# Patient Record
Sex: MALE | Race: ASIAN | Employment: OTHER | URBAN - METROPOLITAN AREA
[De-identification: names, ages, dates, MRNs, and addresses within clinical notes are randomized per-mention and may not be internally consistent; named-entity substitution may affect disease eponyms.]

---

## 2021-01-26 ENCOUNTER — OFFICE VISIT (OUTPATIENT)
Dept: NEUROLOGY | Facility: CLINIC | Age: 56
End: 2021-01-26
Payer: MEDICARE

## 2021-01-26 VITALS
RESPIRATION RATE: 16 BRPM | WEIGHT: 186 LBS | HEIGHT: 66.5 IN | SYSTOLIC BLOOD PRESSURE: 138 MMHG | HEART RATE: 74 BPM | DIASTOLIC BLOOD PRESSURE: 72 MMHG | BODY MASS INDEX: 29.54 KG/M2

## 2021-01-26 DIAGNOSIS — I65.23 CAROTID STENOSIS, ASYMPTOMATIC, BILATERAL: ICD-10-CM

## 2021-01-26 DIAGNOSIS — I63.511 STROKE DUE TO STENOSIS OF RIGHT MIDDLE CEREBRAL ARTERY (HCC): Primary | ICD-10-CM

## 2021-01-26 DIAGNOSIS — I48.91 ATRIAL FIBRILLATION, UNSPECIFIED TYPE (HCC): ICD-10-CM

## 2021-01-26 PROCEDURE — 99204 OFFICE O/P NEW MOD 45 MIN: CPT | Performed by: OTHER

## 2021-01-26 RX ORDER — OLMESARTAN MEDOXOMIL 20 MG/1
20 TABLET ORAL DAILY
COMMUNITY

## 2021-01-26 RX ORDER — EMPAGLIFLOZIN 25 MG/1
1 TABLET, FILM COATED ORAL DAILY
COMMUNITY

## 2021-01-26 RX ORDER — PIOGLITAZONE HCL AND METFORMIN HCL 850; 15 MG/1; MG/1
1 TABLET ORAL 2 TIMES DAILY WITH MEALS
COMMUNITY

## 2021-01-26 RX ORDER — LINAGLIPTIN 5 MG/1
5 TABLET, FILM COATED ORAL DAILY
COMMUNITY

## 2021-01-26 RX ORDER — GABAPENTIN 300 MG/1
300 CAPSULE ORAL 3 TIMES DAILY
COMMUNITY
End: 2021-03-03

## 2021-01-26 RX ORDER — ATORVASTATIN CALCIUM 80 MG/1
80 TABLET, FILM COATED ORAL NIGHTLY
COMMUNITY

## 2021-01-26 RX ORDER — ASPIRIN AND DIPYRIDAMOLE 25; 200 MG/1; MG/1
1 CAPSULE, EXTENDED RELEASE ORAL 2 TIMES DAILY
COMMUNITY

## 2021-01-26 NOTE — PROGRESS NOTES
[id number] [facility] with [facility]  1/26/2021    12:02 PM      48 RHM    HPI:  Following vitrectomy of the left eye, he had a stroke with right temporal stroke which left him with left sided weakness.    He nev FAM: family history includes Diabetes in his mother; High Cholesterol in his father and mother; Hypertension in his father and mother; Kidney Disease in his mother; Stroke in his father. ROS: A comprehensive 10 point review of systems was completed.   Pe

## 2021-02-01 ENCOUNTER — HOSPITAL ENCOUNTER (OUTPATIENT)
Dept: MRI IMAGING | Facility: HOSPITAL | Age: 56
Discharge: HOME | End: 2021-02-01
Attending: Other
Payer: MEDICARE

## 2021-02-01 DIAGNOSIS — I65.23 CAROTID STENOSIS, ASYMPTOMATIC, BILATERAL: ICD-10-CM

## 2021-02-01 DIAGNOSIS — I63.511 STROKE DUE TO STENOSIS OF RIGHT MIDDLE CEREBRAL ARTERY (HCC): ICD-10-CM

## 2021-02-01 LAB — CREAT BLD-MCNC: 1.2 MG/DL

## 2021-02-01 PROCEDURE — 70549 MR ANGIOGRAPH NECK W/O&W/DYE: CPT | Performed by: OTHER

## 2021-02-01 PROCEDURE — 70546 MR ANGIOGRAPH HEAD W/O&W/DYE: CPT | Performed by: OTHER

## 2021-02-01 PROCEDURE — 82565 ASSAY OF CREATININE: CPT

## 2021-02-01 PROCEDURE — 70553 MRI BRAIN STEM W/O & W/DYE: CPT | Performed by: OTHER

## 2021-02-01 PROCEDURE — A9575 INJ GADOTERATE MEGLUMI 0.1ML: HCPCS | Performed by: OTHER

## 2021-03-03 ENCOUNTER — OFFICE VISIT (OUTPATIENT)
Dept: NEUROLOGY | Facility: CLINIC | Age: 56
End: 2021-03-03
Payer: MEDICARE

## 2021-03-03 VITALS
SYSTOLIC BLOOD PRESSURE: 132 MMHG | HEART RATE: 74 BPM | WEIGHT: 186 LBS | RESPIRATION RATE: 16 BRPM | BODY MASS INDEX: 29.54 KG/M2 | DIASTOLIC BLOOD PRESSURE: 74 MMHG | HEIGHT: 66.5 IN

## 2021-03-03 DIAGNOSIS — I65.23 CAROTID STENOSIS, ASYMPTOMATIC, BILATERAL: ICD-10-CM

## 2021-03-03 DIAGNOSIS — G89.0 CENTRAL PAIN SYNDROME: ICD-10-CM

## 2021-03-03 DIAGNOSIS — I48.91 ATRIAL FIBRILLATION, UNSPECIFIED TYPE (HCC): ICD-10-CM

## 2021-03-03 DIAGNOSIS — I63.511 STROKE DUE TO STENOSIS OF RIGHT MIDDLE CEREBRAL ARTERY (HCC): Primary | ICD-10-CM

## 2021-03-03 PROCEDURE — 99214 OFFICE O/P EST MOD 30 MIN: CPT | Performed by: OTHER

## 2021-03-03 RX ORDER — CLOPIDOGREL BISULFATE 75 MG/1
75 TABLET ORAL DAILY
Qty: 90 TABLET | Refills: 3 | Status: SHIPPED | OUTPATIENT
Start: 2021-03-03

## 2021-03-03 RX ORDER — ASPIRIN 81 MG/1
81 TABLET ORAL DAILY
Qty: 90 TABLET | Refills: 3 | Status: SHIPPED | OUTPATIENT
Start: 2021-03-03

## 2021-03-03 RX ORDER — GABAPENTIN 300 MG/1
300 CAPSULE ORAL 3 TIMES DAILY
Qty: 270 CAPSULE | Refills: 3 | Status: SHIPPED | OUTPATIENT
Start: 2021-03-03

## 2021-03-03 NOTE — PROGRESS NOTES
[facility] with [address]  7/27/1965  Primary Care Provider:  Meghana Baker II    3/3/2021  Accompanied visit: sister    ( ) No.      48year old yo patient being seen for: conjunctiva anicteric sclerae  Neck no adenopathy, thyroid normal  Heart and Lungs:  normal    NEURO  Same findings      INTERPRETATION of RELEVANT LABS and other DATA:    CONCLUSION:    1.  Mild irregularity of M1 segments bilaterally without focal signifi [facility] meetings - patient not present --non F2F  (  ) Independent Historian obtained    Non Face to Face CPT code 71811/40532 applies as documented above    PROCEDURE DONE     (   ) see notes      After visit, patient was escorted out and handed-off discharge

## 2021-03-15 ENCOUNTER — ANCILLARY PROCEDURE (OUTPATIENT)
Dept: CARDIOLOGY | Age: 56
End: 2021-03-15
Attending: INTERNAL MEDICINE

## 2021-03-15 ENCOUNTER — ANCILLARY ORDERS (OUTPATIENT)
Dept: CARDIOLOGY | Age: 56
End: 2021-03-15

## 2021-03-15 ENCOUNTER — OFFICE VISIT (OUTPATIENT)
Dept: CARDIOLOGY | Age: 56
End: 2021-03-15

## 2021-03-15 VITALS
HEIGHT: 66 IN | BODY MASS INDEX: 31.98 KG/M2 | HEART RATE: 79 BPM | SYSTOLIC BLOOD PRESSURE: 126 MMHG | DIASTOLIC BLOOD PRESSURE: 74 MMHG | WEIGHT: 199 LBS

## 2021-03-15 DIAGNOSIS — I63.9 CEREBROVASCULAR ACCIDENT (CVA), UNSPECIFIED MECHANISM (CMD): ICD-10-CM

## 2021-03-15 DIAGNOSIS — Z98.890 HISTORY OF LOOP RECORDER: ICD-10-CM

## 2021-03-15 DIAGNOSIS — Q21.12 PFO (PATENT FORAMEN OVALE): ICD-10-CM

## 2021-03-15 DIAGNOSIS — I25.10 CORONARY ARTERY DISEASE INVOLVING NATIVE CORONARY ARTERY OF NATIVE HEART WITHOUT ANGINA PECTORIS: ICD-10-CM

## 2021-03-15 DIAGNOSIS — I48.91 ATRIAL FIBRILLATION, UNSPECIFIED TYPE (CMD): Primary | ICD-10-CM

## 2021-03-15 PROBLEM — E11.39 TYPE 2 DIABETES MELLITUS WITH OPHTHALMIC COMPLICATION, WITHOUT LONG-TERM CURRENT USE OF INSULIN (CMD): Status: ACTIVE | Noted: 2021-03-15

## 2021-03-15 PROCEDURE — 93291 INTERROG DEV EVAL SCRMS IP: CPT | Performed by: INTERNAL MEDICINE

## 2021-03-15 PROCEDURE — 99205 OFFICE O/P NEW HI 60 MIN: CPT | Performed by: INTERNAL MEDICINE

## 2021-03-15 PROCEDURE — 93000 ELECTROCARDIOGRAM COMPLETE: CPT | Performed by: INTERNAL MEDICINE

## 2021-03-15 RX ORDER — ATORVASTATIN CALCIUM 80 MG/1
80 TABLET, FILM COATED ORAL AT BEDTIME
COMMUNITY

## 2021-03-15 RX ORDER — OLMESARTAN MEDOXOMIL 20 MG/1
20 TABLET ORAL DAILY
COMMUNITY

## 2021-03-15 RX ORDER — PANTOPRAZOLE SODIUM 40 MG/1
40 TABLET, DELAYED RELEASE ORAL DAILY
COMMUNITY
Start: 2020-08-27

## 2021-03-15 RX ORDER — ASPIRIN 81 MG/1
81 TABLET ORAL DAILY
COMMUNITY
Start: 2021-03-03

## 2021-03-15 RX ORDER — PIOGLITAZONE HCL AND METFORMIN HCL 850; 15 MG/1; MG/1
1 TABLET ORAL 2 TIMES DAILY
COMMUNITY

## 2021-03-15 RX ORDER — ASPIRIN AND DIPYRIDAMOLE 25; 200 MG/1; MG/1
1 CAPSULE, EXTENDED RELEASE ORAL DAILY
COMMUNITY
End: 2021-03-15 | Stop reason: CLARIF

## 2021-03-15 RX ORDER — CLOPIDOGREL BISULFATE 75 MG/1
75 TABLET ORAL DAILY
COMMUNITY
Start: 2021-03-03

## 2021-03-15 RX ORDER — GABAPENTIN 300 MG/1
300 CAPSULE ORAL DAILY
COMMUNITY
Start: 2021-03-03

## 2021-03-15 SDOH — HEALTH STABILITY: PHYSICAL HEALTH: ON AVERAGE, HOW MANY MINUTES DO YOU ENGAGE IN EXERCISE AT THIS LEVEL?: 0 MIN

## 2021-03-15 SDOH — SOCIAL STABILITY: SOCIAL NETWORK: ARE YOU MARRIED, WIDOWED, DIVORCED, SEPARATED, NEVER MARRIED, OR LIVING WITH A PARTNER?: NEVER MARRIED

## 2021-03-15 SDOH — HEALTH STABILITY: PHYSICAL HEALTH: ON AVERAGE, HOW MANY DAYS PER WEEK DO YOU ENGAGE IN MODERATE TO STRENUOUS EXERCISE (LIKE A BRISK WALK)?: 0 DAYS

## 2021-03-15 ASSESSMENT — PATIENT HEALTH QUESTIONNAIRE - PHQ9
CLINICAL INTERPRETATION OF PHQ9 SCORE: NO FURTHER SCREENING NEEDED
CLINICAL INTERPRETATION OF PHQ2 SCORE: NO FURTHER SCREENING NEEDED
SUM OF ALL RESPONSES TO PHQ9 QUESTIONS 1 AND 2: 0
2. FEELING DOWN, DEPRESSED OR HOPELESS: NOT AT ALL
1. LITTLE INTEREST OR PLEASURE IN DOING THINGS: NOT AT ALL
SUM OF ALL RESPONSES TO PHQ9 QUESTIONS 1 AND 2: 0

## 2021-04-12 PROCEDURE — 93298 REM INTERROG DEV EVAL SCRMS: CPT | Performed by: INTERNAL MEDICINE

## 2021-04-12 PROCEDURE — G2066 INTER DEVC REMOTE 30D: HCPCS | Performed by: INTERNAL MEDICINE

## 2021-04-16 ENCOUNTER — ANCILLARY PROCEDURE (OUTPATIENT)
Dept: CARDIOLOGY | Age: 56
End: 2021-04-16
Attending: INTERNAL MEDICINE

## 2021-04-16 ENCOUNTER — ANCILLARY ORDERS (OUTPATIENT)
Dept: CARDIOLOGY | Age: 56
End: 2021-04-16

## 2021-04-16 DIAGNOSIS — Z98.890 HISTORY OF LOOP RECORDER: ICD-10-CM

## 2021-05-13 PROCEDURE — 93298 REM INTERROG DEV EVAL SCRMS: CPT | Performed by: INTERNAL MEDICINE

## 2021-05-13 PROCEDURE — G2066 INTER DEVC REMOTE 30D: HCPCS | Performed by: INTERNAL MEDICINE

## 2021-06-13 PROCEDURE — 93298 REM INTERROG DEV EVAL SCRMS: CPT | Performed by: INTERNAL MEDICINE

## 2021-06-13 PROCEDURE — G2066 INTER DEVC REMOTE 30D: HCPCS | Performed by: INTERNAL MEDICINE

## 2021-06-15 ENCOUNTER — TELEPHONE (OUTPATIENT)
Dept: CARDIOLOGY | Age: 56
End: 2021-06-15

## 2021-09-28 ENCOUNTER — LAB ENCOUNTER (OUTPATIENT)
Dept: LAB | Facility: HOSPITAL | Age: 56
End: 2021-09-28
Attending: INTERNAL MEDICINE
Payer: MEDICARE

## 2021-09-28 DIAGNOSIS — Z95.818 STATUS POST PLACEMENT OF IMPLANTABLE LOOP RECORDER: ICD-10-CM

## 2021-09-29 NOTE — H&P
[place]  Pre Procedure History and Physical    [facility] Patient Status:  Outpatient in a Bed    1965 MRN [id number]   Location [address] [facility] Attending Tony Blake MD   Hosp Day # 0 PCP [facility] NEPRELIM, WBC, PLT in the last 168 hours. [unfilled]  No results for input(s): BNP in the last 168 hours. No results found for: PT, INR  No results found for: TROP      No results found.        Assessment and Plan    There is no problem list on file for

## 2021-10-01 ENCOUNTER — HOSPITAL ENCOUNTER (OUTPATIENT)
Dept: INTERVENTIONAL RADIOLOGY/VASCULAR | Facility: HOSPITAL | Age: 56
Discharge: HOME | End: 2021-10-01
Attending: INTERNAL MEDICINE | Admitting: INTERNAL MEDICINE
Payer: MEDICARE

## 2021-10-01 VITALS
WEIGHT: 200 LBS | HEIGHT: 66 IN | HEART RATE: 84 BPM | SYSTOLIC BLOOD PRESSURE: 155 MMHG | OXYGEN SATURATION: 98 % | BODY MASS INDEX: 32.14 KG/M2 | RESPIRATION RATE: 14 BRPM | DIASTOLIC BLOOD PRESSURE: 78 MMHG

## 2021-10-01 DIAGNOSIS — Z95.818 STATUS POST PLACEMENT OF IMPLANTABLE LOOP RECORDER: Primary | ICD-10-CM

## 2021-10-01 PROCEDURE — 82962 GLUCOSE BLOOD TEST: CPT

## 2021-10-01 PROCEDURE — 0JPT02Z REMOVAL OF MONITORING DEVICE FROM TRUNK SUBCUTANEOUS TISSUE AND FASCIA, OPEN APPROACH: ICD-10-PCS | Performed by: INTERNAL MEDICINE

## 2021-10-01 RX ORDER — LIDOCAINE HYDROCHLORIDE AND EPINEPHRINE 10; 10 MG/ML; UG/ML
INJECTION, SOLUTION INFILTRATION; PERINEURAL
Status: COMPLETED
Start: 2021-10-01 | End: 2021-10-01

## 2021-10-01 NOTE — PROCEDURES
[facility]  Implantable Loop Recorder Removal Procedure Note    Donnalee Heady Patient Status:  Outpatient in a Bed    1950 MRN [id number]   Location [address] [facility] Attending No att. providers found   [facility] Day # 0

## 2021-10-01 NOTE — PROGRESS NOTES
Pt s/p loop recorder removal in holding with Dr. Saleem Anthony. Left chest post removal with steri-strips and aquacel dressing, CDI, no bleeding or hematoma. Discharge instructions reviewed with pt and pt sister, copy given.  Pt discharged to [facility] entrance via

## 2022-02-08 RX ORDER — CLOPIDOGREL BISULFATE 75 MG/1
TABLET ORAL
Qty: 30 TABLET | Refills: 0 | Status: SHIPPED | OUTPATIENT
Start: 2022-02-08

## 2022-02-08 RX ORDER — ASPIRIN 81 MG/1
TABLET, COATED ORAL
Qty: 30 TABLET | Refills: 0 | Status: SHIPPED | OUTPATIENT
Start: 2022-02-08

## 2022-03-21 ENCOUNTER — OFFICE VISIT (OUTPATIENT)
Dept: NEUROLOGY | Facility: CLINIC | Age: 57
End: 2022-03-21
Payer: MEDICARE

## 2022-03-21 VITALS
BODY MASS INDEX: 32.14 KG/M2 | HEIGHT: 66 IN | SYSTOLIC BLOOD PRESSURE: 130 MMHG | RESPIRATION RATE: 16 BRPM | DIASTOLIC BLOOD PRESSURE: 80 MMHG | HEART RATE: 74 BPM | WEIGHT: 200 LBS

## 2022-03-21 DIAGNOSIS — I63.511 STROKE DUE TO STENOSIS OF RIGHT MIDDLE CEREBRAL ARTERY (HCC): Primary | ICD-10-CM

## 2022-03-21 DIAGNOSIS — I48.91 ATRIAL FIBRILLATION, UNSPECIFIED TYPE (HCC): ICD-10-CM

## 2022-03-21 PROCEDURE — 99214 OFFICE O/P EST MOD 30 MIN: CPT | Performed by: OTHER

## 2022-09-13 ENCOUNTER — PATIENT MESSAGE (OUTPATIENT)
Dept: NEUROLOGY | Facility: CLINIC | Age: 57
End: 2022-09-13

## 2022-09-14 NOTE — TELEPHONE ENCOUNTER
From: Shruthi Galarza  To: Ulises Covarrubias MD  Sent: 9/13/2022 10:31 AM CDT  Subject: Requesting a Letter    Hi Doctor,  My brother, Yaneth Jacinto who is your patient is applying for State ID. As one of the requirements, they need a letter from the doctor that he has disabilities. Thank you.

## 2022-09-14 NOTE — TELEPHONE ENCOUNTER
Asked family via [employer] to provide more specific information as to what they are applying for and what they need from this office.

## 2022-09-16 NOTE — TELEPHONE ENCOUNTER
Letter pended to include information below. Routed to provider for edit/signature. Hello,  He is applying for [place] ID and was informed that the letter should include his disabilities: legally blind/weakness due to post CVA, with this letter, his ID will indicate that he has disabilities. Thank you.

## 2022-10-15 ENCOUNTER — LAB ENCOUNTER (OUTPATIENT)
Dept: LAB | Facility: HOSPITAL | Age: 57
End: 2022-10-15
Attending: Other
Payer: MEDICARE

## 2022-10-15 DIAGNOSIS — I63.511 STROKE DUE TO STENOSIS OF RIGHT MIDDLE CEREBRAL ARTERY (HCC): ICD-10-CM

## 2022-10-15 DIAGNOSIS — I48.91 ATRIAL FIBRILLATION, UNSPECIFIED TYPE (HCC): ICD-10-CM

## 2022-10-15 LAB
ASPIRIN PLT INHIBITION: 404 ARU (ref 620–672)
P2Y12 REACTION UNITS: 156 PRU

## 2022-10-15 PROCEDURE — 85576 BLOOD PLATELET AGGREGATION: CPT

## 2022-10-15 PROCEDURE — 36415 COLL VENOUS BLD VENIPUNCTURE: CPT

## 2022-10-17 ENCOUNTER — OFFICE VISIT (OUTPATIENT)
Dept: NEUROLOGY | Facility: CLINIC | Age: 57
End: 2022-10-17
Payer: MEDICARE

## 2022-10-17 ENCOUNTER — TELEPHONE (OUTPATIENT)
Dept: NEUROLOGY | Facility: CLINIC | Age: 57
End: 2022-10-17

## 2022-10-17 DIAGNOSIS — I63.511 STROKE DUE TO STENOSIS OF RIGHT MIDDLE CEREBRAL ARTERY (HCC): Primary | ICD-10-CM

## 2022-10-17 DIAGNOSIS — I48.91 ATRIAL FIBRILLATION, UNSPECIFIED TYPE (HCC): ICD-10-CM

## 2022-10-17 DIAGNOSIS — R29.898 LEFT LEG WEAKNESS: ICD-10-CM

## 2023-04-17 ENCOUNTER — OFFICE VISIT (OUTPATIENT)
Dept: NEUROLOGY | Facility: CLINIC | Age: 58
End: 2023-04-17
Payer: MEDICARE

## 2023-04-17 VITALS
HEIGHT: 66 IN | DIASTOLIC BLOOD PRESSURE: 81 MMHG | WEIGHT: 200 LBS | RESPIRATION RATE: 16 BRPM | HEART RATE: 81 BPM | SYSTOLIC BLOOD PRESSURE: 156 MMHG | BODY MASS INDEX: 32.14 KG/M2

## 2023-04-17 DIAGNOSIS — R42 DIZZINESS: ICD-10-CM

## 2023-04-17 DIAGNOSIS — R29.898 LEFT LEG WEAKNESS: Primary | ICD-10-CM

## 2023-04-17 DIAGNOSIS — I63.511 STROKE DUE TO STENOSIS OF RIGHT MIDDLE CEREBRAL ARTERY (HCC): ICD-10-CM

## 2023-04-17 DIAGNOSIS — I48.91 ATRIAL FIBRILLATION, UNSPECIFIED TYPE (HCC): ICD-10-CM

## 2023-04-17 PROCEDURE — 99213 OFFICE O/P EST LOW 20 MIN: CPT | Performed by: OTHER

## 2024-04-03 ENCOUNTER — OFFICE VISIT (OUTPATIENT)
Dept: NEUROLOGY | Facility: CLINIC | Age: 59
End: 2024-04-03
Payer: MEDICARE

## 2024-04-03 VITALS
SYSTOLIC BLOOD PRESSURE: 142 MMHG | HEART RATE: 105 BPM | RESPIRATION RATE: 16 BRPM | WEIGHT: 200 LBS | DIASTOLIC BLOOD PRESSURE: 68 MMHG | BODY MASS INDEX: 32.14 KG/M2 | HEIGHT: 66 IN

## 2024-04-03 DIAGNOSIS — R29.898 LEFT LEG WEAKNESS: Primary | ICD-10-CM

## 2024-04-03 DIAGNOSIS — I63.511 STROKE DUE TO STENOSIS OF RIGHT MIDDLE CEREBRAL ARTERY (HCC): ICD-10-CM

## 2024-04-03 DIAGNOSIS — I15.0 RENOVASCULAR HYPERTENSION: ICD-10-CM

## 2024-04-03 PROCEDURE — 99214 OFFICE O/P EST MOD 30 MIN: CPT | Performed by: OTHER

## 2024-04-03 RX ORDER — AMLODIPINE BESYLATE 5 MG/1
5 TABLET ORAL DAILY
COMMUNITY

## 2024-04-03 RX ORDER — GABAPENTIN 100 MG/1
100 CAPSULE ORAL 2 TIMES DAILY
COMMUNITY
Start: 2024-01-14

## 2024-04-03 RX ORDER — PIOGLITAZONEHYDROCHLORIDE 45 MG/1
45 TABLET ORAL DAILY
COMMUNITY
Start: 2024-02-01

## 2024-04-03 RX ORDER — PANTOPRAZOLE SODIUM 40 MG/1
40 TABLET, DELAYED RELEASE ORAL DAILY
COMMUNITY
Start: 2023-12-23

## 2024-04-03 NOTE — PROGRESS NOTES
NEUROLOGY  [facility]       Steve Bolton  7/27/1965  Primary Care Provider:  TRUMAN SHEPHERD II    4/3/2024  58 year old yo,  was last seen on:: April 2023    Seen for/plans last visit:  Post stroke left hemiparesis    Previous visit and existing record notes reviewed in preparation for the face to face visit.  Relevant labs and studies reviewed and will be noted in relevant areas of this record.  Accompanied visit: family    () No.      Present condition:  July 2023 had increase NV and left sided numbness and thought he had another stroke,. MRI however was negative.     Now BP is better    Past History update/new problem(s): as above    Review of Systems:  Review of Systems:  Denies systemic symptoms     No CP or SOB.  No GI or  symptoms. Relevant Neuro as noted above.      Medications:      Current Outpatient Medications:     amLODIPine 5 MG Oral Tab, Take 1 tablet (5 mg total) by mouth daily., Disp: , Rfl:     gabapentin 100 MG Oral Cap, Take 1 capsule (100 mg total) by mouth 2 (two) times daily., Disp: , Rfl:     metFORMIN 850 MG Oral Tab, Take 1 tablet (850 mg total) by mouth 2 (two) times daily with meals., Disp: , Rfl:     pantoprazole 40 MG Oral Tab EC, Take 1 tablet (40 mg total) by mouth daily., Disp: , Rfl:     pioglitazone 45 MG Oral Tab, Take 1 tablet (45 mg total) by mouth daily., Disp: , Rfl:     CLOPIDOGREL 75 MG Oral Tab, TAKE 1 TABLET DAILY, Disp: 30 tablet, Rfl: 0    ASPIRIN LOW DOSE 81 MG Oral Tab EC, TAKE 1 TABLET DAILY, Disp: 30 tablet, Rfl: 0    olmesartan 20 MG Oral Tab, Take 1 tablet (20 mg total) by mouth daily., Disp: , Rfl:     atorvastatin 80 MG Oral Tab, Take 1 tablet (80 mg total) by mouth nightly., Disp: , Rfl:     Riboflavin (B-2-400) 400 MG Oral Cap, Take by mouth., Disp: , Rfl:   PRN:     Allergies:  Allergies   Allergen Reactions    Penicillins UNKNOWN and OTHER (SEE COMMENTS)     Other reaction(s): Other (See Comments)  unknown            EXAM:  BP  142/68 (BP Location: Left arm, Patient Position: Sitting, Cuff Size: adult)   Pulse 105   Resp 16   Ht 66\"   Wt 200 lb (90.7 kg)   BMI 32.28 kg/m²   Looks stated age  General Exam:  HENT:  pink conjunctiva anicteric sclerae  Neck no adenopathy, thyroid normal  Heart and Lungs:  normal  Extremities: no cyanosis, skin changes    NEURO  Residual left hemiparesis  VA impaired      INTERPRETATION of RELEVANT LABS and other DATA:    MRI done reported as negative for acute stroke      Problem/s Identified this visit:   1. Left leg weakness    2. Stroke due to stenosis of right middle cerebral artery (HCC)    3. Renovascular hypertension          Discussion plus Diagnostics & Treatment Orders:  Discussed importance of control of risk factors  Same medications  Guidelines for treating HTN reviewed with patient        (x) Discussed potential side effects of any treatment relevant to above.  Includes explanation of tests as necessary.    Return in about 1 year (around 4/3/2025).      Patient understands that if needed, based on condition and or test results, follow up will be readjusted      Дмитрий Alamo MD  Vascular & General Neurology  Director, Multiple Sclerosis Program  [facility]  4/3/2024, Time completed 2:02 PM    Decision making:  ( x ) labs reviewed/ordered - 1  (  ) new diagnosis: - 1  ( x) Images & studies independently reviewed -non F2F  (  ) Case/studies discussed with other caregivers - -non F2F  (  ) Telephone time with patiern or authorized Fam member--non F2F  ( x ) other records reviewed --non F2F including consultations  (  ) [facility] meetings - patient not present --non F2F  (  ) Independent Historian obtained    Non Face to Face CPT code 69338/12874 applies as documented above    PROCEDURE DONE     (   ) see notes        After visit, patient was escorted out and handed-off discharge process and instructions to the check out desk.  No additional issues relevant to visit were raised  to staff at this time interval.        This document is to be interpreted as my current opinion regarding the case as of the stated date of service based on the information available to me at this time and may supersedes any prior opinion expressed either orally or in writing.  Services rendered are only within the scope of direct medical care  Sometimes, reports may have been prepared partially using a speech recognition software technology.  If a word or phrase is confusing or out of context, please do not hesitate to call for clarification.

## 2024-04-03 NOTE — PATIENT INSTRUCTIONS
Refill policies:    Allow 2-3 business days for refills; controlled substances may take longer.  Contact your pharmacy at least 5 days prior to running out of medication and have them send an electronic request or submit request through the “request refill” option in your [employer] account.  Refills are not addressed on weekends; covering physicians do not authorize routine medications on weekends.  No narcotics or controlled substances are refilled after noon on Fridays or by on call physicians.  By law, narcotics must be electronically prescribed.  A 30 day supply with no refills is the maximum allowed.  If your prescription is due for a refill, you may be due for a follow up appointment.  To best provide you care, patients receiving routine medications need to be seen at least once a year.  Patients receiving narcotic/controlled substance medications need to be seen at least once every 3 months.  In the event that your preferred pharmacy does not have the requested medication in stock (e.g. Backordered), it is your responsibility to find another pharmacy that has the requested medication available.  We will gladly send a new prescription to that pharmacy at your request.    Scheduling Tests:    If your physician has ordered radiology tests such as MRI or CT scans, please contact Central Scheduling at [phone] right away to schedule the test.  Once scheduled, the [facility] Centralized Referral Team will work with your insurance carrier to obtain pre-certification or prior authorization.  Depending on your insurance carrier, approval may take 3-10 days.  It is highly recommended patients assure they have received an authorization before having a test performed.  If test is done without insurance authorization, patient may be responsible for the entire amount billed.      Precertification and Prior Authorizations:  If your physician has recommended that you have a procedure or additional testing performed the [facility]  Centralized Referral Team will contact your insurance carrier to obtain pre-certification or prior authorization.    You are strongly encouraged to contact your insurance carrier to verify that your procedure/test has been approved and is a COVERED benefit.  Although the [facility] Centralized Referral Team does its due diligence, the insurance carrier gives the disclaimer that \"Although the procedure is authorized, this does not guarantee payment.\"    Ultimately the patient is responsible for payment.   Thank you for your understanding in this matter.  Paperwork Completion:  If you require FMLA or disability paperwork for your recovery, please make sure to either drop it off or have it faxed to our office at [phone]. Be sure the form has your name and date of birth on it.  The form will be faxed to our Forms Department and they will complete it for you.  There is a 25$ fee for all forms that need to be filled out.  Please be aware there is a 10-14 day turnaround time.  You will need to sign a release of information (QUANG) form if your paperwork does not come with one.  You may call the Forms Department with any questions at [phone].  Their fax number is [phone].

## (undated) NOTE — LETTER
9/13/2022  Leticia Robbins  7/27/1965        To Whom it May Concern,      Leticia Robbins is my patient, seen for post-stroke care. Jose J Denton is legally blind, has left     side hemiparesis (weakness and difficulty with movements and sensation), and uses a cane. Please include this information on State ID when issued. If we can provide any additional information, please do not hesitate to call my office.       Thank you,            Carmel Goodell, MD  Vascular and General Neurology  [facility]